# Patient Record
Sex: FEMALE | Race: BLACK OR AFRICAN AMERICAN | Employment: FULL TIME | URBAN - METROPOLITAN AREA
[De-identification: names, ages, dates, MRNs, and addresses within clinical notes are randomized per-mention and may not be internally consistent; named-entity substitution may affect disease eponyms.]

---

## 2021-02-19 ENCOUNTER — HOSPITAL ENCOUNTER (OUTPATIENT)
Dept: PREADMISSION TESTING | Age: 61
Discharge: HOME | End: 2021-02-19
Payer: COMMERCIAL

## 2021-02-19 PROCEDURE — U0005 INFEC AGEN DETEC AMPLI PROBE: HCPCS

## 2021-02-20 LAB — SARS-COV-2, COV2NT: NOT DETECTED

## 2021-02-22 RX ORDER — DEXTROMETHORPHAN/PSEUDOEPHED 2.5-7.5/.8
1.2 DROPS ORAL
Status: CANCELLED | OUTPATIENT
Start: 2021-02-22

## 2021-02-22 RX ORDER — ATROPINE SULFATE 0.1 MG/ML
0.5 INJECTION INTRAVENOUS
Status: CANCELLED | OUTPATIENT
Start: 2021-02-22 | End: 2021-02-23

## 2021-02-22 RX ORDER — DIPHENHYDRAMINE HYDROCHLORIDE 50 MG/ML
50 INJECTION, SOLUTION INTRAMUSCULAR; INTRAVENOUS ONCE
Status: CANCELLED | OUTPATIENT
Start: 2021-02-22 | End: 2021-02-22

## 2021-02-22 RX ORDER — SODIUM CHLORIDE 0.9 % (FLUSH) 0.9 %
5-40 SYRINGE (ML) INJECTION EVERY 8 HOURS
Status: CANCELLED | OUTPATIENT
Start: 2021-02-22

## 2021-02-22 RX ORDER — SODIUM CHLORIDE 0.9 % (FLUSH) 0.9 %
5-40 SYRINGE (ML) INJECTION AS NEEDED
Status: CANCELLED | OUTPATIENT
Start: 2021-02-22

## 2021-02-22 RX ORDER — EPINEPHRINE 0.1 MG/ML
1 INJECTION INTRACARDIAC; INTRAVENOUS
Status: CANCELLED | OUTPATIENT
Start: 2021-02-22 | End: 2021-02-23

## 2021-02-22 RX ORDER — FLUMAZENIL 0.1 MG/ML
0.2 INJECTION INTRAVENOUS
Status: CANCELLED | OUTPATIENT
Start: 2021-02-22 | End: 2021-02-22

## 2021-02-23 ENCOUNTER — HOSPITAL ENCOUNTER (OUTPATIENT)
Age: 61
Setting detail: OUTPATIENT SURGERY
Discharge: HOME | End: 2021-02-23
Attending: INTERNAL MEDICINE | Admitting: INTERNAL MEDICINE
Payer: COMMERCIAL

## 2021-02-23 VITALS
HEART RATE: 55 BPM | OXYGEN SATURATION: 100 % | HEIGHT: 66 IN | DIASTOLIC BLOOD PRESSURE: 73 MMHG | WEIGHT: 200.19 LBS | TEMPERATURE: 97.8 F | RESPIRATION RATE: 16 BRPM | BODY MASS INDEX: 32.17 KG/M2 | SYSTOLIC BLOOD PRESSURE: 132 MMHG

## 2021-02-23 PROCEDURE — 76040000007: Performed by: INTERNAL MEDICINE

## 2021-02-23 PROCEDURE — 2709999900 HC NON-CHARGEABLE SUPPLY: Performed by: INTERNAL MEDICINE

## 2021-02-23 PROCEDURE — 74011250636 HC RX REV CODE- 250/636: Performed by: INTERNAL MEDICINE

## 2021-02-23 PROCEDURE — 77030039961 HC KT CUST COLON BSC -D: Performed by: INTERNAL MEDICINE

## 2021-02-23 PROCEDURE — G0500 MOD SEDAT ENDO SERVICE >5YRS: HCPCS | Performed by: INTERNAL MEDICINE

## 2021-02-23 PROCEDURE — 77030040361 HC SLV COMPR DVT MDII -B: Performed by: INTERNAL MEDICINE

## 2021-02-23 RX ORDER — FENTANYL CITRATE 50 UG/ML
100 INJECTION, SOLUTION INTRAMUSCULAR; INTRAVENOUS
Status: DISCONTINUED | OUTPATIENT
Start: 2021-02-23 | End: 2021-02-23 | Stop reason: HOSPADM

## 2021-02-23 RX ORDER — SODIUM CHLORIDE 9 MG/ML
1000 INJECTION, SOLUTION INTRAVENOUS CONTINUOUS
Status: DISCONTINUED | OUTPATIENT
Start: 2021-02-23 | End: 2021-02-23 | Stop reason: HOSPADM

## 2021-02-23 RX ORDER — MIDAZOLAM HYDROCHLORIDE 1 MG/ML
.25-5 INJECTION, SOLUTION INTRAMUSCULAR; INTRAVENOUS
Status: DISCONTINUED | OUTPATIENT
Start: 2021-02-23 | End: 2021-02-23 | Stop reason: HOSPADM

## 2021-02-23 RX ORDER — AMLODIPINE BESYLATE 2.5 MG/1
2.5 TABLET ORAL DAILY
COMMUNITY

## 2021-02-23 RX ORDER — ATORVASTATIN CALCIUM 10 MG/1
10 TABLET, FILM COATED ORAL DAILY
COMMUNITY

## 2021-02-23 RX ORDER — NALOXONE HYDROCHLORIDE 0.4 MG/ML
0.4 INJECTION, SOLUTION INTRAMUSCULAR; INTRAVENOUS; SUBCUTANEOUS
Status: DISCONTINUED | OUTPATIENT
Start: 2021-02-23 | End: 2021-02-23 | Stop reason: HOSPADM

## 2021-02-23 RX ADMIN — SODIUM CHLORIDE 1000 ML: 900 INJECTION, SOLUTION INTRAVENOUS at 11:15

## 2021-02-23 NOTE — H&P
Assessment/Plan  # Detail Type Description    1. Assessment Dysphagia, unspecified (R13.10). Patient Plan PT of Dr. Claus Domingo, referred to GI for dysphagia to solid foods that began 4 months ago, with solid foods only getting stuck in the mid chest area. Never had an EGD done, or other GI studies for this. She states certain foods are worse than others; especially nuts, but bread, boiled eggs, and other types of solid foods getting caught in mid chest area. She denies eating fast, and states she has to heave to get the food to move. Once it passes, she is fine. Denies difficulty swallowing liquids, abdominal pain, fear to eat, weight loss, N/V, or prior CVA. She states has heartburn occasionally, but takes no medication to treat this. She takes Gas-X occasionally for excess gas. Will proceed with EGD with Dr. Sara Fodr, to evaluate upper GI tract for abnormalities, evidence of bleeding, and Jacobs's epithelium. with biopsies and dilation as indicated. She as HLD, and hypertension, taking daily antihypertensive medications. Hold all AM medications on the day of the procedure, and bring BP med bottles to procedure. Explained risks of procedure to include bleeding, infection, reaction to sedation, and perforation with possible need for admission to the hospital, and in the most extensive of  circumstances, the patient may require surgery. Pt verbalized understanding of these risks and is agreeable with this procedure. She also has history of Right hemicolectomy (2013), and partial sigmoid resection (2001), and Sx scar tissue removal (2003). Last colonoscopy done 6/15/2015 by Dr. Sara Ford @[facility], negative for polyps, 10yr Recall. Plan Orders Further diagnostic evaluations ordered today include(s) UPPER GI ENDOSCOPY, DIAGNOSIS to be performed. This 64year old female presents for Dysphagia. History of Present Illness:  1. Dysphagia   The difficulty in swallowing began 4 months ago. The symptoms are mild, unchanged and occur constantly. The symptoms occur with solids only which cause gagging with food sticking in the mid chest. The patient is also experiencing heartburn. Additional information: BM 2x daily. PROBLEM LIST:     Problem Description Onset Date Chronic Clinical Status Notes   Essential hypertension 2015 N     Headache 2015 N     Breast lump 2014 N               PAST MEDICAL/SURGICAL HISTORY   (Detailed)    Disease/disorder Onset Date Management Date Comments   Pregnancy  6 hr labor      Pregnancy  8 hr labor        R-colon resection       scar tissue removed       orthodentic surgery     volvulus of sigmoid colon s/p resection  - cscope done at that time  sigmoid colectomy     volvulus of cecum s/p resection which included appendectomy       cervical dysplasia dx at [facility];       cervical polyp dx at [facility]       HTN       torticolis         GYNECOLOGIC HISTORY:  Patient is perimenopausal.   OBSTETRIC HISTORY:  Not currently pregnant. Family History  (Detailed)  Relationship Family Member Name  Age at Death Condition Onset Age Cause of Death       No family history of colon cancer  N       Family history of Diabetes mellitus  N   Father    Hypertension  N   Father    Cancer, prostate  N   Father    Dementia  N         Social History:  (Detailed)  Tobacco use reviewed. Preferred language is [place]. The patient does not need an . MARITAL STATUS/FAMILY/SOCIAL SUPPORT  Marital status:    CHILDREN  Does not have children. Tobacco use status: Never smoked tobacco.  Smoking status: Never smoker. TOBACCO SCREENING:  Patient has never used tobacco. Patient has not used tobacco in the last 30 days. Patient has not used smokeless tobacco in the last 30 days.     SMOKING STATUS  Type Smoking Status Usage Per Day Years Used Pack Years Total Pack Years    Never smoker TOBACCO/VAPING EXPOSURE  No passive smoke exposure. ALCOHOL  There is a history of alcohol use. Type: Beer. consumed rarely. CAFFEINE  The patient uses caffeine: soda. LIFESTYLE  Moderate activity level. Health club member. Exercise includes weights. Exercises 3-4 times a week. DIET  , high cholesterol. SLEEP PATTERNS  Patient has no changes to sleep patterns. Medications (active prior to today)  Medication Name Sig Description Start Date Stop Date Refilled Rx Elsewhere   multivitamin capsule take 1 capsule by oral route  every day //  11/25/2015 Y   AMLODIPINE BESYLATE 5 MG TAB TAKE 1 TABLET BY MOUTH EVERY DAY 04/09/2020 04/09/2020 N   CETIRIZINE HCL 10 MG TABLET TAKE 1 TABLET BY MOUTH EVERY DAY 06/22/2020 06/22/2020 N   Shingrix (PF) 50 mcg/0.5 mL intramuscular suspension, kit inject 0.5 milliliter by intramuscular route - repeat in 2 months 06/29/2020   N   LISINOPRIL 40 MG TABLET TAKE 1 TABLET BY MOUTH EVERY DAY 11/10/2020  11/10/2020 N   ATORVASTATIN 10 MG TABLET TAKE 1 TABLET BY MOUTH EVERY DAY 01/11/2021 01/11/2021 N     Patient Status   Completed with information received for patient in a summary of care record. Medication Reconciliation  Medications reconciled today.   Medication Reviewed  Adherence Medication Name Sig Desc Elsewhere Status   taking as directed multivitamin capsule take 1 capsule by oral route  every day Y Verified   taking as directed CETIRIZINE HCL 10 MG TABLET TAKE 1 TABLET BY MOUTH EVERY DAY N Verified   taking as directed AMLODIPINE BESYLATE 5 MG TAB TAKE 1 TABLET BY MOUTH EVERY DAY N Verified   taking as directed Shingrix (PF) 50 mcg/0.5 mL intramuscular suspension, kit inject 0.5 milliliter by intramuscular route - repeat in 2 months N Verified   taking as directed LISINOPRIL 40 MG TABLET TAKE 1 TABLET BY MOUTH EVERY DAY N Verified   taking as directed ATORVASTATIN 10 MG TABLET TAKE 1 TABLET BY MOUTH EVERY DAY N Verified     Medications (Added, Continued or Stopped today)  Start Date Medication Directions PRN Status PRN Reason Instruction Stop Date   04/09/2020 AMLODIPINE BESYLATE 5 MG TAB TAKE 1 TABLET BY MOUTH EVERY DAY N      01/11/2021 ATORVASTATIN 10 MG TABLET TAKE 1 TABLET BY MOUTH EVERY DAY N      06/22/2020 CETIRIZINE HCL 10 MG TABLET TAKE 1 TABLET BY MOUTH EVERY DAY N      11/10/2020 LISINOPRIL 40 MG TABLET TAKE 1 TABLET BY MOUTH EVERY DAY N       multivitamin capsule take 1 capsule by oral route  every day N      06/29/2020 Shingrix (PF) 50 mcg/0.5 mL intramuscular suspension, kit inject 0.5 milliliter by intramuscular route - repeat in 2 months N        Allergies:  Ingredient Reaction (Severity) Medication Name Comment   LEVOFLOXACIN Rash     Reviewed, no changes. ORDERS:  Status Lab Order Time Frame Comments   ordered UPPER GI ENDOSCOPY, DIAGNOSIS       Review of System  System Neg/Pos Details   Constitutional Negative Fever. ENMT Negative Sinus Infection. Eyes Negative Double vision. Respiratory Negative Asthma, Chronic cough and Dyspnea. Cardio Negative Edema and Irregular heartbeat/palpitations. GI Positive Heartburn. GI Negative Abdominal pain, Change in bowel habits, Constipation, Diarrhea, Dysphagia, Hematemesis, Hematochezia, Melena and Reflux.  Negative Dysuria and Hematuria. Endocrine Negative Cold intolerance and Heat intolerance. Neuro Negative Dizziness, Headache, Numbness and Tremors. Psych Negative Anxiety, Depression and Increased stress. Integumentary Negative Hives, Pruritus and Rash. MS Negative Joint pain and Myalgia. Hema/Lymph Negative Easy bleeding, Easy bruising and Lymphadenopathy. Allergic/Immuno Negative Food allergies and Immunosuppression.      Vital Signs   Gynecologic History  Patient is perimenopausal.    Height  Time ft in cm Last Measured Height Position   10:11 AM 5.0 6.00 167.64 02/08/2018      Date/Time Temp Pulse BP MAP (Calculated) Arterial Line 1 BP (mmHg) BP Patient Position Resp SpO2 O2 Device O2 Flow Rate (L/min) Pre/Post Ductal Weight   02/23/21 1048 96.9 °F (36.1 °C) 59Abnormal  136/68 91 -- -- 16 99 % Room air -- -- 90.8 kg (200 lb 3 oz)       PHYSICAL EXAM:  Exam Findings Details   Constitutional Normal Well developed.   Eyes Normal Conjunctiva - Right: Normal, Left: Normal. Sclera - Right: Normal, Left: Normal.   Nasopharynx Normal Lips/teeth/gums - Normal.   Neck Exam Normal Inspection - Normal.   Respiratory Normal Inspection - Normal.   Cardiovascular Normal Regular rate and rhythm. No murmurs, gallops, or rubs.   Vascular Normal Pulses - Brachial: Normal.   Abdomen * Obese.   Abdomen Normal Inspection - Normal. Appliance(s) - None. Auscultation - Normal. Percussion - Normal. Anterior palpation - No guarding. No abdominal tenderness. No hepatic enlargement. No hernia. No ascites.   Skin Normal Inspection - Normal.   Musculoskeletal Normal Hands/Wrist - Right: Normal, Left: Normal.   Extremity Normal No edema.   Neurological Normal Fine motor skills - Normal.   Psychiatric Normal Orientation - Oriented to time, place, person & situation. Appropriate mood and affect.     No change in H&P
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
## 2021-02-23 NOTE — DISCHARGE INSTRUCTIONS
Evon Moody  [id number]  1960    EGD DISCHARGE INSTRUCTIONS  Discomfort:  Sore throat- throat lozenges or warm salt water gargle  redness at IV site- apply warm compress to area; if redness or soreness persist- contact your physician  Gaseous discomfort- walking, belching will help relieve any discomfort  You may not operate a vehicle until the next day  You may not engage in an occupation involving machinery or appliances until the next day  You may not drink alcoholic beverages until the next day  Avoid making any critical decisions for at least 24 hour    DIET   You may not resume your regular diet. Antireflux diet. Chew well food    ACTIVITY  You may not resume your normal daily activities   Spend the remainder of the day resting -  avoid any strenuous activity. CALL M.D. ANY SIGN OF   Increasing pain, nausea, vomiting  Abdominal distension (swelling)  New increased bleeding (oral or rectal)  Fever (chills)  Pain in chest area  Bloody discharge from nose or mouth  Shortness of breath     You may take any Advil, Aspirin, Ibuprofen, Motrin, Aleve, or Goodys but preferably  Tylenol as needed for pain. Follow-up Instructions: Follow-up in the office as scheduled or make a follow-up appointment in 2 weeks. Keyla Amador MD  February 23, 2021        DISCHARGE SUMMARY from Nurse    PATIENT INSTRUCTIONS:    After general anesthesia or intravenous sedation, for 24 hours or while taking prescription Narcotics:  · Limit your activities  · Do not drive and operate hazardous machinery  · Do not make important personal or business decisions  · Do  not drink alcoholic beverages  · If you have not urinated within 8 hours after discharge, please contact your surgeon on call.     Report the following to your surgeon:  · Excessive pain, swelling, redness or odor of or around the surgical area  · Temperature over 100.5  · Nausea and vomiting lasting longer than 4 hours or if unable to take medications  · Any signs of decreased circulation or nerve impairment to extremity: change in color, persistent  numbness, tingling, coldness or increase pain  · Any questions    What to do at Home:  Recommended activity: AS ABOVE,     If you experience any of the following symptoms AS ABOVE, please follow up with DR. SANTOS. *  Please give a list of your current medications to your Primary Care Provider. *  Please update this list whenever your medications are discontinued, doses are      changed, or new medications (including over-the-counter products) are added. *  Please carry medication information at all times in case of emergency situations. These are general instructions for a healthy lifestyle:    No smoking/ No tobacco products/ Avoid exposure to second hand smoke  Surgeon General's Warning:  Quitting smoking now greatly reduces serious risk to your health. Obesity, smoking, and sedentary lifestyle greatly increases your risk for illness    A healthy diet, regular physical exercise & weight monitoring are important for maintaining a healthy lifestyle    You may be retaining fluid if you have a history of heart failure or if you experience any of the following symptoms:  Weight gain of 3 pounds or more overnight or 5 pounds in a week, increased swelling in our hands or feet or shortness of breath while lying flat in bed. Please call your doctor as soon as you notice any of these symptoms; do not wait until your next office visit. The discharge information has been reviewed with the patient. The patient verbalized understanding. Discharge medications reviewed with the patient and appropriate educational materials and side effects teaching were provided.   ___________________________________________________________________________________________________________________________________  Patient armband removed and shredded

## 2021-11-30 ENCOUNTER — HOSPITAL ENCOUNTER (OUTPATIENT)
Dept: GENERAL RADIOLOGY | Age: 61
Discharge: HOME | End: 2021-11-30
Payer: COMMERCIAL

## 2021-11-30 ENCOUNTER — TRANSCRIBE ORDER (OUTPATIENT)
Dept: REGISTRATION | Age: 61
End: 2021-11-30

## 2021-11-30 DIAGNOSIS — M20.11 ACQUIRED HALLUX VALGUS OF RIGHT FOOT: ICD-10-CM

## 2021-11-30 DIAGNOSIS — M20.11 ACQUIRED HALLUX VALGUS OF RIGHT FOOT: Primary | ICD-10-CM

## 2021-11-30 PROCEDURE — 73630 X-RAY EXAM OF FOOT: CPT

## (undated) DEVICE — SINGLE PORT MANIFOLD: Brand: NEPTUNE 2

## (undated) DEVICE — MAJ-1414 SINGLE USE ADPATER BIOPSY VALV: Brand: SINGLE USE ADAPTOR BIOPSY VALVE

## (undated) DEVICE — SPONGE GZ W4XL4IN COT 12 PLY TYP VII WVN C FLD DSGN

## (undated) DEVICE — TRAP SPEC COLL POLYP POLYSTYR --

## (undated) DEVICE — REM POLYHESIVE ADULT PATIENT RETURN ELECTRODE: Brand: VALLEYLAB

## (undated) DEVICE — KENDALL RADIOLUCENT FOAM MONITORING ELECTRODE RECTANGULAR SHAPE: Brand: KENDALL

## (undated) DEVICE — MOUTHPIECE ENDOSCP 20X27MM --

## (undated) DEVICE — GARMENT,MEDLINE,DVT,INT,CALF,MED, GEN2: Brand: MEDLINE

## (undated) DEVICE — Device

## (undated) DEVICE — TUBING, SUCTION, 1/4" X 12', STRAIGHT: Brand: MEDLINE